# Patient Record
Sex: MALE | Race: WHITE
[De-identification: names, ages, dates, MRNs, and addresses within clinical notes are randomized per-mention and may not be internally consistent; named-entity substitution may affect disease eponyms.]

---

## 2017-02-10 ENCOUNTER — HOSPITAL ENCOUNTER (OUTPATIENT)
Dept: HOSPITAL 61 - ECHO | Age: 52
Discharge: HOME | End: 2017-02-10
Attending: FAMILY MEDICINE
Payer: COMMERCIAL

## 2017-02-10 DIAGNOSIS — I37.1: ICD-10-CM

## 2017-02-10 DIAGNOSIS — I35.8: ICD-10-CM

## 2017-02-10 DIAGNOSIS — I07.1: ICD-10-CM

## 2017-02-10 DIAGNOSIS — I35.1: Primary | ICD-10-CM

## 2017-02-10 DIAGNOSIS — I34.0: ICD-10-CM

## 2017-02-10 PROCEDURE — 93306 TTE W/DOPPLER COMPLETE: CPT

## 2017-02-10 NOTE — CARD
--------------- APPROVED REPORT --------------





EXAM: Two-dimensional and M-mode echocardiogram with Doppler and color Doppler.



Other Information 

Quality : Good



INDICATION

Murmur , Fatigue



RISK FACTORS

Hypertension 



 LEFT VENTRICLE 

The left ventricle is normal size. There is normal left ventricular wall thickness. The left ventricu
lar systolic function is normal and the ejection fraction is within normal range. The Ejection Fracti
on is 60-65%. There is normal LV segmental wall motion. The left ventricular diastolic function and f
illing is normal for age.



 RIGHT VENTRICLE 

The right ventricle is normal size. There is normal right ventricular wall thickness. The right ventr
icular systolic function is normal.



 ATRIA 

The left atrium size is normal. The right atrium size is normal. The interatrial septum is intact wit
h no evidence for an atrial septal defect or patent foramen ovale as noted on 2-D or Doppler imaging.




 AORTIC VALVE 

The aortic valve is moderately sclerotic. Doppler and Color Flow revealed mild aortic regurgitation. 
There is no significant aortic valvular stenosis.



 MITRAL VALVE 

The mitral valve leaflets are thickened. There is no evidence of mitral valve prolapse. There is no m
itral valve stenosis. Doppler and Color Flow revealed mild mitral regurgitation.



 TRICUSPID VALVE 

Doppler and Color Flow revealed mild tricuspid regurgitation. The pulmonary artery systolic pressure 
is estimated at 25 mmHg. There is no pulmonary hypertension.



 PULMONIC VALVE 

Doppler and Color Flow revealed mild pulmonic valvular regurgitation. There is no pulmonic valvular s
tenosis.



 GREAT VESSELS 

The aortic root is normal in size. The ascending aorta is normal in size. The pulmonary artery is nor
mal. The IVC is normal in size and collapses >50% with inspiration.



 PERICARDIAL EFFUSION 

There is no evidence of significant pericardial effusion.



Critical Notification

Critical Value: No



<Conclusion>

The left ventricular systolic function is normal and the ejection fraction is within normal range.

The Ejection Fraction is 60-65%.

There is normal LV segmental wall motion.

Mild aortic regurgitation.

Mild mitral regurgitation.

Mild tricuspid regurgitation.

The pulmonary artery systolic pressure is estimated at 25 mmHg. 

There is no pulmonary hypertension.

There is no evidence of significant pericardial effusion.

## 2018-10-12 ENCOUNTER — HOSPITAL ENCOUNTER (OUTPATIENT)
Dept: HOSPITAL 61 - KCIC | Age: 53
Discharge: HOME | End: 2018-10-12
Attending: FAMILY MEDICINE
Payer: COMMERCIAL

## 2018-10-12 DIAGNOSIS — Z98.1: ICD-10-CM

## 2018-10-12 DIAGNOSIS — J45.901: Primary | ICD-10-CM

## 2018-10-12 PROCEDURE — 71046 X-RAY EXAM CHEST 2 VIEWS: CPT

## 2018-10-12 NOTE — [facility]
EXAM: PA and Lateral Views of the Chest 

 

DATE: 10/12/2018 12:00 AM

 

INDICATION:

 

COMPARISON: No Prior

 

FINDINGS:

The heart is not enlarged.

 

Mediastinal and hilar contours are normal.

 

No focal parenchymal airspace opacity.

 

No pleural effusion or pneumothorax.

 

Lower cervical spine fusion hardware partially profiled

 

IMPRESSION:

1.  No radiographic evidence for acute cardiopulmonary process.

 

Electronically signed by: Peter Gilbert MD (10/12/2018 5:01 PM) [id number]